# Patient Record
Sex: FEMALE | Race: WHITE
[De-identification: names, ages, dates, MRNs, and addresses within clinical notes are randomized per-mention and may not be internally consistent; named-entity substitution may affect disease eponyms.]

---

## 2017-08-24 ENCOUNTER — HOSPITAL ENCOUNTER (OUTPATIENT)
Dept: HOSPITAL 63 - DXRADRC | Age: 57
Discharge: HOME | End: 2017-08-24
Attending: PHYSICIAN ASSISTANT
Payer: COMMERCIAL
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
## 2017-08-24 DIAGNOSIS — S92.335A: Primary | ICD-10-CM
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
## 2017-08-24 PROCEDURE — 73630 X-RAY EXAM OF FOOT: CPT

## 2017-08-24 NOTE — RAD
Left foot, 3 views, 8/24/2017:



History: Pain for one week



There is patchy bony demineralization. There is a nondisplaced fracture of the

distal third metatarsal shaft. No other fracture or dislocation is identified.

There is mild subcutaneous edema about the foot.



IMPRESSION: Nondisplaced third metatarsal fracture.

## 2017-09-26 ENCOUNTER — HOSPITAL ENCOUNTER (OUTPATIENT)
Dept: HOSPITAL 63 - DXRADRC | Age: 57
Discharge: HOME | End: 2017-09-26
Attending: PHYSICIAN ASSISTANT
Payer: COMMERCIAL
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
## 2017-09-26 DIAGNOSIS — S92.332D: Primary | ICD-10-CM

## 2017-09-26 PROCEDURE — 73630 X-RAY EXAM OF FOOT: CPT

## 2017-09-26 NOTE — RAD
EXAM: Left foot, 3 views.



HISTORY: Fracture follow-up.



COMPARISON: 8/24/2017.



FINDINGS: Frontal, lateral and oblique views of the left foot are obtained.

There is a healing mildly displaced fracture of the distal aspect of the third

metatarsal, with surrounding callus formation. There is approximately one

cortical width displacement along the fracture line, similar compared to the

prior study. There is bone demineralization, a component of which may be due

to disuse osteopenia.



IMPRESSION: Healing fracture of the distal aspect of the third metatarsal.

## 2017-11-21 ENCOUNTER — HOSPITAL ENCOUNTER (OUTPATIENT)
Dept: HOSPITAL 63 - MAMMO | Age: 57
Discharge: HOME | End: 2017-11-21
Attending: PHYSICIAN ASSISTANT
Payer: COMMERCIAL

## 2017-11-21 DIAGNOSIS — Z12.31: Primary | ICD-10-CM

## 2017-11-21 PROCEDURE — 77063 BREAST TOMOSYNTHESIS BI: CPT

## 2017-11-21 NOTE — RAD
DATE: 11/21/2017.



EXAM: MAMMO ROSA SCREENING BILATERAL.



HISTORY: Routine mammographic screening.



COMPARISON: 11/10/2015.



This study was interpreted with the benefit of Computerized Aided Detection

(CAD).



FINDINGS:



The breast parenchyma is dense, which could reduce sensitivity of mammography.

 Breast parenchyma level density D..



There are no suspicious masses, microcalcifications or architectural

distortion.  A small nodule medially on the left is stable. Coarse and

scattered calcifications are benign.



BI-RADS CATEGORY: 2 BENIGN FINDING(S).



RECOMMENDED FOLLOW-UP: 12M 12 MONTH FOLLOW-UP.



PQRS compliance statement: Patient information was entered into a reminder

system with a target due date 11/21/2018 for the next mammogram.



Mammography is a sensitive method for finding small breast cancers, but it

does not detect them all and is not a substitute for careful clinical

examination.  A negative mammogram does not negate a clinically suspicious

finding and should not result in delay in biopsying a clinically suspicious

abnormality.



"Our facility is accredited by the [employer] Mammography

Program."

## 2018-12-31 ENCOUNTER — HOSPITAL ENCOUNTER (OUTPATIENT)
Dept: HOSPITAL 63 - MAMMO | Age: 58
Discharge: HOME | End: 2018-12-31
Attending: PHYSICIAN ASSISTANT
Payer: COMMERCIAL

## 2018-12-31 DIAGNOSIS — Z12.31: Primary | ICD-10-CM

## 2018-12-31 PROCEDURE — 77067 SCR MAMMO BI INCL CAD: CPT

## 2018-12-31 PROCEDURE — 77063 BREAST TOMOSYNTHESIS BI: CPT

## 2019-01-07 NOTE — RAD
DATE: 12/31/2018 3:00 PM



EXAM: MAMMO ROSA SCREENING BILATERAL



HISTORY: routine screening evaluation. 



COMPARISON: Prior mammographic imaging dating back to 6/28/2010



Bilateral CC and MLO views of the breasts were performed. Bilateral breast 
tomosynthesis was performed in CC and MLO projections.



This study was interpreted with the benefit of Computerized Aided Detection (CAD
).



Breast Density: The breast parenchyma is dense, which could reduce the 
sensitivity of mammography. Breast parenchyma level density D.



FINDINGS:

The parenchymal pattern appears stable. Benign calcifications are present. 



No suspicious masses, microcalcifications or architectural distortion is 
present to suggest malignancy in either breast.



The visualized axillae are unremarkable. 



IMPRESSION: No mammographic evidence of malignancy. 



BI-RADS CATEGORY: 2 BENIGN FINDING(S)



RECOMMENDED FOLLOW-UP: 12M 12 MONTH FOLLOW-UP Annual screening mammography is 
recommended, unless clinically indicated sooner based on symptoms or change in 
physical exam.



PQRS compliance statement: Patient information was entered into a reminder 
system with a target due date 12/31/2019 for the next mammogram.



Mammography is a sensitive method for finding small breast cancers, but it does 
not detect them all and is not a substitute for careful clinical examination. A 
negative mammogram does not negate a clinically suspicious finding and should 
not result in delay in biopsying a clinically suspicious abnormality.



"Our facility is accredited by the [employer] Mammography 
Program."
[id number]